# Patient Record
Sex: FEMALE | Race: WHITE | NOT HISPANIC OR LATINO
[De-identification: names, ages, dates, MRNs, and addresses within clinical notes are randomized per-mention and may not be internally consistent; named-entity substitution may affect disease eponyms.]

---

## 2017-01-27 PROBLEM — Z00.00 ENCOUNTER FOR PREVENTIVE HEALTH EXAMINATION: Status: ACTIVE | Noted: 2017-01-27

## 2017-01-30 PROBLEM — Z86.39 HISTORY OF HYPERCHOLESTEROLEMIA: Status: RESOLVED | Noted: 2017-01-30 | Resolved: 2017-01-30

## 2017-01-30 PROBLEM — Z86.39 HISTORY OF HYPOTHYROIDISM: Status: RESOLVED | Noted: 2017-01-30 | Resolved: 2017-01-30

## 2017-01-30 RX ORDER — OXYBUTYNIN CHLORIDE 2.5 MG/1
TABLET ORAL
Refills: 0 | Status: ACTIVE | COMMUNITY

## 2017-01-30 RX ORDER — RIVAROXABAN 20 MG/1
20 TABLET, FILM COATED ORAL
Qty: 30 | Refills: 3 | Status: ACTIVE | COMMUNITY

## 2017-01-30 RX ORDER — SIMVASTATIN 80 MG/1
TABLET, FILM COATED ORAL
Refills: 0 | Status: ACTIVE | COMMUNITY

## 2017-01-30 RX ORDER — LEVOTHYROXINE SODIUM 137 UG/1
TABLET ORAL
Refills: 0 | Status: ACTIVE | COMMUNITY

## 2017-02-01 ENCOUNTER — APPOINTMENT (OUTPATIENT)
Dept: ELECTROPHYSIOLOGY | Facility: CLINIC | Age: 77
End: 2017-02-01

## 2017-02-01 VITALS
DIASTOLIC BLOOD PRESSURE: 64 MMHG | HEART RATE: 69 BPM | SYSTOLIC BLOOD PRESSURE: 130 MMHG | HEIGHT: 68 IN | WEIGHT: 184 LBS | BODY MASS INDEX: 27.89 KG/M2

## 2017-02-01 DIAGNOSIS — Z86.39 PERSONAL HISTORY OF OTHER ENDOCRINE, NUTRITIONAL AND METABOLIC DISEASE: ICD-10-CM

## 2017-02-14 ENCOUNTER — TRANSCRIPTION ENCOUNTER (OUTPATIENT)
Age: 77
End: 2017-02-14

## 2017-02-14 ENCOUNTER — OUTPATIENT (OUTPATIENT)
Dept: OUTPATIENT SERVICES | Facility: HOSPITAL | Age: 77
LOS: 1 days | Discharge: HOME | End: 2017-02-14
Payer: COMMERCIAL

## 2017-02-14 VITALS
DIASTOLIC BLOOD PRESSURE: 79 MMHG | SYSTOLIC BLOOD PRESSURE: 177 MMHG | WEIGHT: 184.97 LBS | TEMPERATURE: 98 F | RESPIRATION RATE: 14 BRPM | HEART RATE: 66 BPM | HEIGHT: 68 IN | OXYGEN SATURATION: 96 %

## 2017-02-14 DIAGNOSIS — R00.1 BRADYCARDIA, UNSPECIFIED: ICD-10-CM

## 2017-02-14 DIAGNOSIS — Z90.49 ACQUIRED ABSENCE OF OTHER SPECIFIED PARTS OF DIGESTIVE TRACT: Chronic | ICD-10-CM

## 2017-02-14 PROCEDURE — 33282: CPT

## 2017-02-14 PROCEDURE — C1764: CPT

## 2017-02-14 RX ORDER — CEPHALEXIN 500 MG
500 CAPSULE ORAL ONCE
Qty: 0 | Refills: 0 | Status: COMPLETED | OUTPATIENT
Start: 2017-02-14 | End: 2017-02-14

## 2017-02-14 RX ADMIN — Medication 500 MILLIGRAM(S): at 07:41

## 2017-02-14 NOTE — DISCHARGE NOTE ADULT - CARE PROVIDER_API CALL
Quentin Sánchez  [address] [place] [address], [place] [address]  Phone: [phone]  Fax: (   )    -

## 2017-02-14 NOTE — DISCHARGE NOTE ADULT - CARE PLAN
Principal Discharge DX:	Status post placement of implantable loop recorder  Goal:	Successful post operative loop recorder care.  Instructions for follow-up, activity and diet:	Loop Recorder Incision Care:     - Remove the plastic and gauze dressing after 24 hours.   - Do not touch the incision until it is completely healed.   - There is Dermabond (skin glue) on your incision, which will start to flake off on its own over the next 2-3 weeks. Do not pick at or peal off the Dermabond.   - Do not apply soaps, creams, lotions, ointments or powders to the incision until it is completely healed.  - You should call the doctor if you notice redness, drainage, swelling, increased tenderness, hot sensation around the  incision, bleeding or incision edges pulling apart.  Instructions for follow-up, activity and diet:	Please follow up with Dr. Sánchez in 1-2 weeks at [facility]. Call to make an appointment. Principal Discharge DX:	Status post placement of implantable loop recorder  Goal:	Successful post operative loop recorder care.  Instructions for follow-up, activity and diet:	Loop Recorder Incision Care:     - Remove the plastic and gauze dressing after 24 hours.   - Do not touch the incision until it is completely healed.   - There is Dermabond (skin glue) on your incision, which will start to flake off on its own over the next 2-3 weeks. Do not pick at or peal off the Dermabond.   - Do not apply soaps, creams, lotions, ointments or powders to the incision until it is completely healed.  - You should call the doctor if you notice redness, drainage, swelling, increased tenderness, hot sensation around the  incision, bleeding or incision edges pulling apart.  Instructions for follow-up, activity and diet:	Please follow up with Dr. Sánchez in 1-2 weeks at [facility]. Call to make an appointment.

## 2017-02-14 NOTE — DISCHARGE NOTE ADULT - MEDICATION SUMMARY - MEDICATIONS TO TAKE
I will START or STAY ON the medications listed below when I get home from the hospital:    Xarelto 20 mg oral tablet  -- 1 tab(s) by mouth once a day (in the evening)  -- Indication: For Atrial fibrillation    simvastatin 40 mg oral tablet  -- 1 tab(s) by mouth once a day (at bedtime)  -- Indication: For Hypercholesterolemia    Synthroid 100 mcg (0.1 mg) oral tablet  -- 1 tab(s) by mouth once a day  -- Indication: For Hypothyroidism    oxybutynin 15 mg/24 hr oral tablet, extended release  -- 1 tab(s) by mouth once a day  -- Indication: For Prophylaxsis    Vitamin D3 2000 intl units oral capsule  -- 1 cap(s) by mouth once a day  -- Indication: For Prophylaxsis

## 2017-02-14 NOTE — DISCHARGE NOTE ADULT - PLAN OF CARE
Loop Recorder Incision Care:     - Remove the plastic and gauze dressing after 24 hours.   - Do not touch the incision until it is completely healed.   - There is Dermabond (skin glue) on your incision, which will start to flake off on its own over the next 2-3 weeks. Do not pick at or peal off the Dermabond.   - Do not apply soaps, creams, lotions, ointments or powders to the incision until it is completely healed.  - You should call the doctor if you notice redness, drainage, swelling, increased tenderness, hot sensation around the  incision, bleeding or incision edges pulling apart. Successful post operative loop recorder care. Please follow up with Dr. Sánchez in 1-2 weeks at [facility]. Call to make an appointment.

## 2017-02-14 NOTE — H&P CARDIOLOGY - PMH
Aortic valve insufficiency    Hypercholesterolemia    Hypothyroidism    Persistent atrial fibrillation

## 2017-02-14 NOTE — DISCHARGE NOTE ADULT - PATIENT PORTAL LINK FT
“You can access the FollowHealth Patient Portal, offered by [facility], by registering with the following website: [email or web address]”

## 2017-02-14 NOTE — H&P CARDIOLOGY - HISTORY OF PRESENT ILLNESS
76 year old female patient with a history of hyperlipidemia, hypothyroidism, and atrial fibrillation diagnosed in 4/22/16 on Xarelto who present with occasional episodes of sudden lightheadedness that occur while sitting. These episodes only last a few seconds and then resolve spontaneously. Outpatient monitoring reveals episodes of bradycardia down to the 50s. She denies syncope of overt palpitations. She presents today for elective loop recorder implant.     Echo: 5/5/16: EF 58%, LA 3.7cm, mild AI    Cardiologist: Dr. Paulino  PCP: Debi Neumann DO

## 2017-02-14 NOTE — DISCHARGE NOTE ADULT - HOSPITAL COURSE
76 year old female patient with a history of hyperlipidemia, hypothyroidism, and atrial fibrillation diagnosed in 4/22/16 on Xarelto who present with occasional episodes of sudden lightheadedness that occur while sitting. These episodes only last a few seconds and then resolve spontaneously. Outpatient monitoring reveals episodes of bradycardia down to the 50s. She denies syncope of overt palpitations. She is now s/p loop recorder implant.   Echo: 5/5/16: EF 58%, LA 3.7cm, mild AI  Cardiologist: Dr. Paulino  PCP: Debi Neumann DO

## 2017-02-14 NOTE — DISCHARGE NOTE ADULT - PROVIDER TOKENS
FREE:[LAST:[Gonzalo],FIRST:[Quentin],PHONE:[phone]],FAX:[(   )    -],ADDRESS:[[address] [place] [id number]]]

## 2017-02-17 DIAGNOSIS — I48.91 UNSPECIFIED ATRIAL FIBRILLATION: ICD-10-CM

## 2017-05-01 DIAGNOSIS — R00.1 BRADYCARDIA, UNSPECIFIED: ICD-10-CM

## 2017-05-01 DIAGNOSIS — Z78.9 OTHER SPECIFIED HEALTH STATUS: ICD-10-CM

## 2017-05-01 DIAGNOSIS — Z86.79 PERSONAL HISTORY OF OTHER DISEASES OF THE CIRCULATORY SYSTEM: ICD-10-CM

## 2017-05-01 DIAGNOSIS — Z79.01 LONG TERM (CURRENT) USE OF ANTICOAGULANTS: ICD-10-CM

## 2017-05-01 DIAGNOSIS — I07.1 RHEUMATIC TRICUSPID INSUFFICIENCY: ICD-10-CM

## 2017-05-03 ENCOUNTER — APPOINTMENT (OUTPATIENT)
Dept: ELECTROPHYSIOLOGY | Facility: CLINIC | Age: 77
End: 2017-05-03

## 2017-05-03 VITALS
SYSTOLIC BLOOD PRESSURE: 120 MMHG | HEIGHT: 68 IN | HEART RATE: 84 BPM | DIASTOLIC BLOOD PRESSURE: 80 MMHG | WEIGHT: 190 LBS | BODY MASS INDEX: 28.79 KG/M2

## 2017-06-07 ENCOUNTER — APPOINTMENT (OUTPATIENT)
Dept: ELECTROPHYSIOLOGY | Facility: CLINIC | Age: 77
End: 2017-06-07

## 2017-06-07 VITALS
SYSTOLIC BLOOD PRESSURE: 120 MMHG | DIASTOLIC BLOOD PRESSURE: 70 MMHG | HEIGHT: 68 IN | BODY MASS INDEX: 30.46 KG/M2 | WEIGHT: 201 LBS

## 2017-09-11 DIAGNOSIS — Z09 ENCOUNTER FOR FOLLOW-UP EXAMINATION AFTER COMPLETED TREATMENT FOR CONDITIONS OTHER THAN MALIGNANT NEOPLASM: ICD-10-CM

## 2017-09-13 ENCOUNTER — APPOINTMENT (OUTPATIENT)
Dept: ELECTROPHYSIOLOGY | Facility: CLINIC | Age: 77
End: 2017-09-13
Payer: MEDICARE

## 2017-09-13 VITALS
DIASTOLIC BLOOD PRESSURE: 80 MMHG | HEART RATE: 47 BPM | BODY MASS INDEX: 30.56 KG/M2 | SYSTOLIC BLOOD PRESSURE: 118 MMHG | WEIGHT: 201 LBS

## 2017-09-13 DIAGNOSIS — I49.5 SICK SINUS SYNDROME: ICD-10-CM

## 2017-09-13 PROCEDURE — 93000 ELECTROCARDIOGRAM COMPLETE: CPT

## 2017-09-13 PROCEDURE — 93290 INTERROG DEV EVAL ICPMS IP: CPT

## 2017-09-13 PROCEDURE — 99215 OFFICE O/P EST HI 40 MIN: CPT

## 2017-10-24 ENCOUNTER — OUTPATIENT (OUTPATIENT)
Dept: OUTPATIENT SERVICES | Facility: HOSPITAL | Age: 77
LOS: 1 days | End: 2017-10-24
Payer: COMMERCIAL

## 2017-10-24 VITALS
OXYGEN SATURATION: 95 % | HEIGHT: 68 IN | TEMPERATURE: 98 F | SYSTOLIC BLOOD PRESSURE: 169 MMHG | WEIGHT: 199.96 LBS | RESPIRATION RATE: 18 BRPM | DIASTOLIC BLOOD PRESSURE: 73 MMHG | HEART RATE: 58 BPM

## 2017-10-24 VITALS — HEIGHT: 68 IN | WEIGHT: 199.96 LBS

## 2017-10-24 DIAGNOSIS — Z87.898 PERSONAL HISTORY OF OTHER SPECIFIED CONDITIONS: Chronic | ICD-10-CM

## 2017-10-24 DIAGNOSIS — Z01.810 ENCOUNTER FOR PREPROCEDURAL CARDIOVASCULAR EXAMINATION: ICD-10-CM

## 2017-10-24 DIAGNOSIS — Z90.49 ACQUIRED ABSENCE OF OTHER SPECIFIED PARTS OF DIGESTIVE TRACT: Chronic | ICD-10-CM

## 2017-10-24 LAB
ANION GAP SERPL CALC-SCNC: 14 MMOL/L — SIGNIFICANT CHANGE UP (ref 5–17)
APTT BLD: 33.8 SEC — SIGNIFICANT CHANGE UP (ref 27.5–37.4)
BLD GP AB SCN SERPL QL: SIGNIFICANT CHANGE UP
BUN SERPL-MCNC: 15 MG/DL — SIGNIFICANT CHANGE UP (ref 8–20)
CALCIUM SERPL-MCNC: 9.6 MG/DL — SIGNIFICANT CHANGE UP (ref 8.6–10.2)
CHLORIDE SERPL-SCNC: 101 MMOL/L — SIGNIFICANT CHANGE UP (ref 98–107)
CO2 SERPL-SCNC: 26 MMOL/L — SIGNIFICANT CHANGE UP (ref 22–29)
CREAT SERPL-MCNC: 0.63 MG/DL — SIGNIFICANT CHANGE UP (ref 0.5–1.3)
GLUCOSE SERPL-MCNC: 100 MG/DL — SIGNIFICANT CHANGE UP (ref 70–115)
HCT VFR BLD CALC: 38.1 % — SIGNIFICANT CHANGE UP (ref 37–47)
HGB BLD-MCNC: 12.7 G/DL — SIGNIFICANT CHANGE UP (ref 12–16)
INR BLD: 1.08 RATIO — SIGNIFICANT CHANGE UP (ref 0.88–1.16)
MCHC RBC-ENTMCNC: 30.5 PG — SIGNIFICANT CHANGE UP (ref 27–31)
MCHC RBC-ENTMCNC: 33.3 G/DL — SIGNIFICANT CHANGE UP (ref 32–36)
MCV RBC AUTO: 91.4 FL — SIGNIFICANT CHANGE UP (ref 81–99)
PLATELET # BLD AUTO: 210 K/UL — SIGNIFICANT CHANGE UP (ref 150–400)
POTASSIUM SERPL-MCNC: 4.5 MMOL/L — SIGNIFICANT CHANGE UP (ref 3.5–5.3)
POTASSIUM SERPL-SCNC: 4.5 MMOL/L — SIGNIFICANT CHANGE UP (ref 3.5–5.3)
PROTHROM AB SERPL-ACNC: 11.9 SEC — SIGNIFICANT CHANGE UP (ref 9.8–12.7)
RBC # BLD: 4.17 M/UL — LOW (ref 4.4–5.2)
RBC # FLD: 13.4 % — SIGNIFICANT CHANGE UP (ref 11–15.6)
SODIUM SERPL-SCNC: 141 MMOL/L — SIGNIFICANT CHANGE UP (ref 135–145)
TSH SERPL-MCNC: 1.25 UIU/ML — SIGNIFICANT CHANGE UP (ref 0.27–4.2)
TYPE + AB SCN PNL BLD: SIGNIFICANT CHANGE UP
WBC # BLD: 5.6 K/UL — SIGNIFICANT CHANGE UP (ref 4.8–10.8)
WBC # FLD AUTO: 5.6 K/UL — SIGNIFICANT CHANGE UP (ref 4.8–10.8)

## 2017-10-24 PROCEDURE — 93010 ELECTROCARDIOGRAM REPORT: CPT

## 2017-10-24 PROCEDURE — 93005 ELECTROCARDIOGRAM TRACING: CPT

## 2017-10-24 PROCEDURE — G0463: CPT

## 2017-10-24 PROCEDURE — 36415 COLL VENOUS BLD VENIPUNCTURE: CPT

## 2017-10-24 PROCEDURE — 85730 THROMBOPLASTIN TIME PARTIAL: CPT

## 2017-10-24 PROCEDURE — 86850 RBC ANTIBODY SCREEN: CPT

## 2017-10-24 PROCEDURE — 86901 BLOOD TYPING SEROLOGIC RH(D): CPT

## 2017-10-24 PROCEDURE — 80048 BASIC METABOLIC PNL TOTAL CA: CPT

## 2017-10-24 PROCEDURE — 86900 BLOOD TYPING SEROLOGIC ABO: CPT

## 2017-10-24 PROCEDURE — 85610 PROTHROMBIN TIME: CPT

## 2017-10-24 PROCEDURE — 84443 ASSAY THYROID STIM HORMONE: CPT

## 2017-10-24 PROCEDURE — 85027 COMPLETE CBC AUTOMATED: CPT

## 2017-10-24 NOTE — ASU PATIENT PROFILE, ADULT - PMH
Aortic valve insufficiency    Fall  at  home,  no  warning,  no  overt cause,  possible syncope,  had  cat  scan  bruise on left hip  seen at  [facility]  sept 19 2017,  no  injuries  Hypercholesterolemia    Hypothyroidism    Persistent atrial fibrillation    Tachy-sunitha syndrome

## 2017-10-24 NOTE — H&P PST ADULT - PMH
Aortic valve insufficiency    Hypercholesterolemia    Hypothyroidism    Persistent atrial fibrillation Aortic valve insufficiency    Hypercholesterolemia    Hypothyroidism    Persistent atrial fibrillation    Tachy-sunitha syndrome

## 2017-10-24 NOTE — H&P PST ADULT - HISTORY OF PRESENT ILLNESS
77 year old female patient with a history of hypothyroidism, paroxysmal atrial fibrillation and marked bradycardia as found on her loop recorder. She was initially diagnosed with AFib in 4/2016 adn has been well rate controlled. She underwent an ILR implant 2/2017 and was found to have episodes of rapid AFib followed by episodes of bradycardia with rates in the 30-40s. She reports constant fatigue and admits to episodes of lightheadedness but no overt syncope.     Stress Test: 9/8/16: no ischemia seen.   Echo: 5/5/2016: EF 58%, LA 3.7cm, mild AI, mild MR, trace-mild TR

## 2017-10-27 ENCOUNTER — TRANSCRIPTION ENCOUNTER (OUTPATIENT)
Age: 77
End: 2017-10-27

## 2017-10-27 ENCOUNTER — INPATIENT (INPATIENT)
Facility: HOSPITAL | Age: 77
LOS: 0 days | Discharge: HOME | DRG: 244 | End: 2017-10-28
Attending: STUDENT IN AN ORGANIZED HEALTH CARE EDUCATION/TRAINING PROGRAM | Admitting: STUDENT IN AN ORGANIZED HEALTH CARE EDUCATION/TRAINING PROGRAM
Payer: COMMERCIAL

## 2017-10-27 VITALS
RESPIRATION RATE: 18 BRPM | OXYGEN SATURATION: 97 % | TEMPERATURE: 98 F | SYSTOLIC BLOOD PRESSURE: 164 MMHG | HEART RATE: 66 BPM | DIASTOLIC BLOOD PRESSURE: 77 MMHG

## 2017-10-27 DIAGNOSIS — Z87.898 PERSONAL HISTORY OF OTHER SPECIFIED CONDITIONS: Chronic | ICD-10-CM

## 2017-10-27 DIAGNOSIS — Z90.49 ACQUIRED ABSENCE OF OTHER SPECIFIED PARTS OF DIGESTIVE TRACT: Chronic | ICD-10-CM

## 2017-10-27 DIAGNOSIS — I49.5 SICK SINUS SYNDROME: ICD-10-CM

## 2017-10-27 PROCEDURE — 93010 ELECTROCARDIOGRAM REPORT: CPT

## 2017-10-27 PROCEDURE — 33208 INSRT HEART PM ATRIAL & VENT: CPT

## 2017-10-27 PROCEDURE — 33284: CPT

## 2017-10-27 RX ORDER — LEVOTHYROXINE SODIUM 125 MCG
100 TABLET ORAL DAILY
Qty: 0 | Refills: 0 | Status: DISCONTINUED | OUTPATIENT
Start: 2017-10-27 | End: 2017-10-28

## 2017-10-27 RX ORDER — ALPRAZOLAM 0.25 MG
0.25 TABLET ORAL EVERY 6 HOURS
Qty: 0 | Refills: 0 | Status: DISCONTINUED | OUTPATIENT
Start: 2017-10-27 | End: 2017-10-28

## 2017-10-27 RX ORDER — ACETAMINOPHEN 500 MG
650 TABLET ORAL EVERY 6 HOURS
Qty: 0 | Refills: 0 | Status: DISCONTINUED | OUTPATIENT
Start: 2017-10-27 | End: 2017-10-28

## 2017-10-27 RX ORDER — SIMVASTATIN 20 MG/1
40 TABLET, FILM COATED ORAL AT BEDTIME
Qty: 0 | Refills: 0 | Status: DISCONTINUED | OUTPATIENT
Start: 2017-10-27 | End: 2017-10-28

## 2017-10-27 RX ORDER — CHOLECALCIFEROL (VITAMIN D3) 125 MCG
2000 CAPSULE ORAL DAILY
Qty: 0 | Refills: 0 | Status: DISCONTINUED | OUTPATIENT
Start: 2017-10-27 | End: 2017-10-28

## 2017-10-27 RX ORDER — ONDANSETRON 8 MG/1
4 TABLET, FILM COATED ORAL EVERY 6 HOURS
Qty: 0 | Refills: 0 | Status: DISCONTINUED | OUTPATIENT
Start: 2017-10-27 | End: 2017-10-28

## 2017-10-27 RX ORDER — OXYCODONE HYDROCHLORIDE 5 MG/1
5 TABLET ORAL EVERY 4 HOURS
Qty: 0 | Refills: 0 | Status: DISCONTINUED | OUTPATIENT
Start: 2017-10-27 | End: 2017-10-28

## 2017-10-27 RX ORDER — CEFAZOLIN SODIUM 1 G
2000 VIAL (EA) INJECTION EVERY 8 HOURS
Qty: 0 | Refills: 0 | Status: COMPLETED | OUTPATIENT
Start: 2017-10-27 | End: 2017-10-27

## 2017-10-27 RX ADMIN — Medication 100 MILLIGRAM(S): at 16:27

## 2017-10-27 RX ADMIN — Medication 2000 UNIT(S): at 17:27

## 2017-10-27 RX ADMIN — Medication 100 MILLIGRAM(S): at 23:17

## 2017-10-27 RX ADMIN — SIMVASTATIN 40 MILLIGRAM(S): 20 TABLET, FILM COATED ORAL at 21:59

## 2017-10-27 NOTE — DISCHARGE NOTE ADULT - INSTRUCTIONS
Choose lean meats and poultry without skin and prepare them without added saturated and trans fat.  Eat fish at least twice a week. Recent research shows that eating oily fish containing omega-3 fatty acids (for example, salmon, trout and herring) may help lower your risk of death from coronary artery disease.  Select fat-free, 1 percent fat and low-fat dairy products.  Cut back on foods containing partially hydrogenated vegetable oils to reduce trans fat in your diet.   To lower cholesterol, reduce saturated fat to no more than 5 to 6 percent of total calories. For someone eating 2,000 calories a day, that’s about 13 grams of saturated fat.  Cut back on beverages and foods with added sugars.  Choose and prepare foods with little or no salt. To lower blood pressure, aim to eat no more than 2,400 milligrams of sodium per day. Reducing daily intake to 1,500 mg is desirable because it can lower blood pressure even further.  If you drink alcohol, drink in moderation. That means one drink per day if you’re a woman and two drinks  per day if you’re a man.  Follow the American Heart Association recommendations when you eat out, and keep an eye on your portion sizes. monitor pacemaker site for signs of infection

## 2017-10-27 NOTE — PROGRESS NOTE ADULT - SUBJECTIVE AND OBJECTIVE BOX
PROCEDURE(S): Medtronic Dual Chamber Pacemaker Implant (via cephalic cutdown) and ILR Explant    ELECTRPHYSIOLOGIST(S): Quentin Sánchez MD    COMPLICATIONS:  none          DISPOSITION:  Observation Unit           CONDITION: Stable      Pt doing well s/p Medtronic dual chamber pacemaker implant. Denies complaint    Exam:   T(C): 36.6 (10-27-17 @ 07:42), Max: 36.6 (10-27-17 @ 07:42)  HR: 66 (10-27-17 @ 07:42) (66 - 66)  BP: 164/77 (10-27-17 @ 07:42) (164/77 - 164/77)  RR: 18 (10-27-17 @ 07:42) (18 - 18)  SpO2: 97% (10-27-17 @ 07:42) (97% - 97%)  VSS, NAD, A&O x 3  Incisions: both ILR & pacemaker dressings C/D/I; no bleeding, hematoma, erythema or edema  Card: S1/S2, RRR, no m/g/r  Resp: lungs CTA b/l  Abd: S/NT/ND  Ext: no edema, distal pulses intact    Assessment:   77 year old female patient with a history of hypothyroidism, paroxysmal atrial fibrillation and symptomatic bradycardia secondary to marked sinus node dysfunction as documented on her loop recorder. Now s/p uncomplicated MDT DC PPM implant via cephalic cutdown and ILR explant.     Plan:   Bedrest x 4 hours post implant, then OOB w/ assist & progress as tolerated.    Continued observation on telemetry overnight.   Cont Ancef 2gm IV q hours x 2 additional doses to complete 24 hour course.   Pain control with PO analgesia PRN.   NO HEPARIN OR LOVENOX, INCLUDING PROPHYLACTIC/SUBCUT DOSING, UNTIL OTHERWISE ADVISED BY CHRISTIANA Hartley to resume 10/29/17 PM.   Resume other home medications now.   PA/Lat CXR and device check in AM.   Pending status overnight, anticipate d/c home tomorrow with outpt f/up in 1-2 weeks. PROCEDURE(S): Medtronic Dual Chamber Pacemaker Implant (via cephalic cutdown) and ILR Explant    ELECTRPHYSIOLOGIST(S): Quentin Sánchez MD    COMPLICATIONS:  none          DISPOSITION:  Observation Unit           CONDITION: Stable      Pt doing well s/p Medtronic dual chamber pacemaker implant. Denies complaint    Exam:   T(C): 36.6 (10-27-17 @ 07:42), Max: 36.6 (10-27-17 @ 07:42)  HR: 66 (10-27-17 @ 07:42) (66 - 66)  BP: 164/77 (10-27-17 @ 07:42) (164/77 - 164/77)  RR: 18 (10-27-17 @ 07:42) (18 - 18)  SpO2: 97% (10-27-17 @ 07:42) (97% - 97%)  VSS, NAD, A&O x 3  Incisions: both ILR & pacemaker dressings C/D/I; no bleeding, hematoma, erythema or edema  Card: S1/S2, RRR, no m/g/r  Resp: lungs CTA b/l  Abd: S/NT/ND  Ext: no edema, distal pulses intact    Assessment:   77 year old female patient with a history of hypothyroidism, paroxysmal atrial fibrillation and symptomatic bradycardia secondary to marked sinus node dysfunction as documented on her loop recorder. Now s/p uncomplicated MDT DC PPM implant via cephalic cutdown and ILR explant.     Plan:   Bedrest x 4 hours post implant, then OOB w/ assist & progress as tolerated.    Continued observation on telemetry overnight.   Cont Ancef 2gm IV q hours x 2 additional doses to complete 24 hour course.   Pain control with PO analgesia PRN.   NO HEPARIN OR LOVENOX, INCLUDING PROPHYLACTIC/SUBCUT DOSING, UNTIL OTHERWISE ADVISED BY EP.   Xarelto to resume 10/30/17.   Resume other home medications now.   PA/Lat CXR and device check in AM.   Pending status overnight, anticipate d/c home tomorrow with outpt f/up in 1-2 weeks.

## 2017-10-27 NOTE — DISCHARGE NOTE ADULT - PLAN OF CARE
optimize cardiac health Cardiac Device Implant Post Operative Instructions  - Bruising around the implant site or over the chest, side or arm near the incision is normal, and will take a few weeks to resolve.  -Do not lift the affected arm higher than 90 degrees (shoulder height) in any direction for 4 weeks.   - Do not push, pull or lift anything heavier than 10 lbs (about a gallon of milk) with the affected arm for 4 weeks.     - Do not touch the incision until it is completely healed.   - There are Steristrips (white strips of tape) on your incisions, which will start to peal off on their own over the next 2-3 weeks. Do not pick at or peal off the Steristrips.   - Do not apply soaps, creams, lotions, ointments or powders to the incision until it is completely healed.  You should call the doctor if:   - you notice redness, drainage, swelling, increased tenderness, hot sensation around the  incision, bleeding or incision edges pulling apart.  - your temperature is greater than 100 degress F for more than 24 hours.  - you notice swelling or bulging at the incision or around the device that was not there when you left the hospital or is increasing in size.  -you experience increased difficulty breathing.  - you notice new/worsening swelling in your legs and ankles.  - you faint or have dizzy spells.  -you have any questions or concerns regarding your device or the procedure.

## 2017-10-27 NOTE — DISCHARGE NOTE ADULT - PROVIDER TOKENS
FREE:[LAST:[Gonzalo],FIRST:[Quentin],PHONE:[phone]],FAX:[(   )    -],ADDRESS:[[address] [place] [address]]]

## 2017-10-27 NOTE — DISCHARGE NOTE ADULT - MEDICATION SUMMARY - MEDICATIONS TO TAKE
I will START or STAY ON the medications listed below when I get home from the hospital:    Xarelto 20 mg oral tablet  -- 1 tab(s) by mouth once a day (in the evening)  -- Indication: For blood thinner     simvastatin 40 mg oral tablet  -- 1 tab(s) by mouth once a day (at bedtime)  -- Indication: For cho    Synthroid 100 mcg (0.1 mg) oral tablet  -- 1 tab(s) by mouth once a day  -- Indication: For Thyroid    oxybutynin 15 mg/24 hr oral tablet, extended release  -- 1 tab(s) by mouth once a day  -- Indication: For bladder    Vitamin D3 2000 intl units oral capsule  -- 1 cap(s) by mouth once a day  -- Indication: For vit

## 2017-10-27 NOTE — DISCHARGE NOTE ADULT - CARE PLAN
Principal Discharge DX:	Tachy-sunitha syndrome  Goal:	optimize cardiac health  Instructions for follow-up, activity and diet:	Cardiac Device Implant Post Operative Instructions  - Bruising around the implant site or over the chest, side or arm near the incision is normal, and will take a few weeks to resolve.  -Do not lift the affected arm higher than 90 degrees (shoulder height) in any direction for 4 weeks.   - Do not push, pull or lift anything heavier than 10 lbs (about a gallon of milk) with the affected arm for 4 weeks.     - Do not touch the incision until it is completely healed.   - There are Steristrips (white strips of tape) on your incisions, which will start to peal off on their own over the next 2-3 weeks. Do not pick at or peal off the Steristrips.   - Do not apply soaps, creams, lotions, ointments or powders to the incision until it is completely healed.  You should call the doctor if:   - you notice redness, drainage, swelling, increased tenderness, hot sensation around the  incision, bleeding or incision edges pulling apart.  - your temperature is greater than 100 degress F for more than 24 hours.  - you notice swelling or bulging at the incision or around the device that was not there when you left the hospital or is increasing in size.  -you experience increased difficulty breathing.  - you notice new/worsening swelling in your legs and ankles.  - you faint or have dizzy spells.  -you have any questions or concerns regarding your device or the procedure.

## 2017-10-27 NOTE — DISCHARGE NOTE ADULT - HOSPITAL COURSE
77 year old female patient with a history of hypothyroidism, paroxysmal atrial fibrillation and symptomatic bradycardia secondary to marked sinus node dysfunction as documented on her loop recorder. She presented electively 10/27/17 and underwent uncomplicated Medtronic dual chamber pacemaker implant and ILR explant.

## 2017-10-27 NOTE — DISCHARGE NOTE ADULT - ADDITIONAL INSTRUCTIONS
Follow up with Dr. Sánchez at [facility] in 2 weeks. Please call [phone] to schedule an appointment.

## 2017-10-27 NOTE — DISCHARGE NOTE ADULT - PATIENT PORTAL LINK FT
“You can access the FollowHealth Patient Portal, offered by [facility], by registering with the following website: [email or web address]”

## 2017-10-28 VITALS
SYSTOLIC BLOOD PRESSURE: 161 MMHG | HEART RATE: 60 BPM | OXYGEN SATURATION: 97 % | RESPIRATION RATE: 18 BRPM | DIASTOLIC BLOOD PRESSURE: 74 MMHG

## 2017-10-28 LAB
ANION GAP SERPL CALC-SCNC: 14 MMOL/L — SIGNIFICANT CHANGE UP (ref 5–17)
BUN SERPL-MCNC: 7 MG/DL — LOW (ref 8–20)
CALCIUM SERPL-MCNC: 9.2 MG/DL — SIGNIFICANT CHANGE UP (ref 8.6–10.2)
CHLORIDE SERPL-SCNC: 100 MMOL/L — SIGNIFICANT CHANGE UP (ref 98–107)
CO2 SERPL-SCNC: 23 MMOL/L — SIGNIFICANT CHANGE UP (ref 22–29)
CREAT SERPL-MCNC: 0.53 MG/DL — SIGNIFICANT CHANGE UP (ref 0.5–1.3)
GLUCOSE SERPL-MCNC: 99 MG/DL — SIGNIFICANT CHANGE UP (ref 70–115)
HCT VFR BLD CALC: 39.3 % — SIGNIFICANT CHANGE UP (ref 37–47)
HGB BLD-MCNC: 13.2 G/DL — SIGNIFICANT CHANGE UP (ref 12–16)
MAGNESIUM SERPL-MCNC: 1.9 MG/DL — SIGNIFICANT CHANGE UP (ref 1.6–2.6)
MCHC RBC-ENTMCNC: 30.7 PG — SIGNIFICANT CHANGE UP (ref 27–31)
MCHC RBC-ENTMCNC: 33.6 G/DL — SIGNIFICANT CHANGE UP (ref 32–36)
MCV RBC AUTO: 91.4 FL — SIGNIFICANT CHANGE UP (ref 81–99)
PLATELET # BLD AUTO: 183 K/UL — SIGNIFICANT CHANGE UP (ref 150–400)
POTASSIUM SERPL-MCNC: 4.3 MMOL/L — SIGNIFICANT CHANGE UP (ref 3.5–5.3)
POTASSIUM SERPL-SCNC: 4.3 MMOL/L — SIGNIFICANT CHANGE UP (ref 3.5–5.3)
RBC # BLD: 4.3 M/UL — LOW (ref 4.4–5.2)
RBC # FLD: 13.4 % — SIGNIFICANT CHANGE UP (ref 11–15.6)
SODIUM SERPL-SCNC: 137 MMOL/L — SIGNIFICANT CHANGE UP (ref 135–145)
WBC # BLD: 6.5 K/UL — SIGNIFICANT CHANGE UP (ref 4.8–10.8)
WBC # FLD AUTO: 6.5 K/UL — SIGNIFICANT CHANGE UP (ref 4.8–10.8)

## 2017-10-28 PROCEDURE — 71020: CPT | Mod: 26

## 2017-10-28 PROCEDURE — 93010 ELECTROCARDIOGRAM REPORT: CPT

## 2017-10-28 RX ADMIN — Medication 100 MICROGRAM(S): at 06:05

## 2017-10-28 NOTE — PROGRESS NOTE ADULT - SUBJECTIVE AND OBJECTIVE BOX
Patient is a 77y old  Female who presents with a chief complaint of Elective Medtronic dual chamber pacemaker implant and ILR explant (27 Oct 2017 09:45)      HPI:    77 year old female patient with a history of hypothyroidism, paroxysmal atrial fibrillation and symptomatic bradycardia secondary to marked sinus node dysfunction as documented on her loop recorder. Now s/p uncomplicated MDT DC PPM implant via cephalic cutdown and ILR explant.    PAST MEDICAL & SURGICAL HISTORY:  Fall: at  home,  no  warning,  no  overt cause,  possible syncope,  had  cat  scan  bruise on left hip  seen at  [facility]  sept  19 2017,  no  injuries  Tachy-sunitha syndrome  Persistent atrial fibrillation  Aortic valve insufficiency  Hypercholesterolemia  Hypothyroidism  H/O urinary incontinence  History of appendectomy      PA and LAT CXR no pneumo leads identified    EKG a paced         MEDICATIONS  (STANDING):  cholecalciferol 2000 Unit(s) Oral daily  levothyroxine 100 MICROGram(s) Oral daily  simvastatin 40 milliGRAM(s) Oral at bedtime    MEDICATIONS  (PRN):  acetaminophen   Tablet. 650 milliGRAM(s) Oral every 6 hours PRN Mild Pain (1 - 3)  ALPRAZolam 0.25 milliGRAM(s) Oral every 6 hours PRN anxiety and/or insomnia  aluminum hydroxide/magnesium hydroxide/simethicone Suspension 30 milliLiter(s) Oral every 4 hours PRN Dyspepsia  ondansetron Injectable 4 milliGRAM(s) IV Push every 6 hours PRN Nausea and/or Vomiting  oxyCODONE    IR 5 milliGRAM(s) Oral every 4 hours PRN Moderate Pain (4 - 6)    	    Vital Signs Last 24 Hrs  T(C): 36.4 (27 Oct 2017 22:00), Max: 36.6 (27 Oct 2017 18:31)  T(F): 97.6 (27 Oct 2017 22:00), Max: 97.9 (27 Oct 2017 18:31)  HR: 59 (28 Oct 2017 07:45) (59 - 60)  BP: 151/81 (28 Oct 2017 07:45) (138/66 - 164/80)  BP(mean): --  RR: 18 (28 Oct 2017 07:45) (15 - 20)  SpO2: 98% (28 Oct 2017 07:45) (96% - 98%)        PHYSICAL EXAM:  Appearance: Normal, well nourished	  HEENT:   Normal oral mucosa, PERRL, EOMI, sclera non-icteric	  Lymphatic: No cervical lymphadenopathy  Cardiovascular: Normal S1 S2, No JVD, No cardiac murmurs, No carotid bruits, No peripheral edema  Respiratory: Lungs clear to auscultation	  Psychiatry: A & O x 3, Mood & affect appropriate  Gastrointestinal:  Soft, Non-tender, + BS, no bruits	  Skin: No rashes, No ecchymoses, No cyanosis  Neurologic: Grossly non-focal with full strength in all four extremities  Extremities: Normal range of motion, No clubbing, cyanosis or edema  Vascular: Peripheral pulses palpable 2+ bilaterally  left chest wall ecchymotic no hematoma no bleeding     I  LABS:                        13.2   6.5   )-----------( 183      ( 28 Oct 2017 07:00 )             39.3     10-28    137  |  100  |  7.0<L>  ----------------------------<  99  4.3   |  23.0  |  0.53    Ca    9.2      28 Oct 2017 07:00  Mg     1.9     10-28

## 2017-10-28 NOTE — PROGRESS NOTE ADULT - ASSESSMENT
77 year old female patient with a history of hypothyroidism, paroxysmal atrial fibrillation and symptomatic bradycardia secondary to marked sinus node dysfunction as documented on her loop recorder. Now s/p uncomplicated MDT DC PPM implant via cephalic cutdown and ILR explant.    Plan   dc home   resume Xarelto on 10/30  post PPM instructions reviewed with PT  continue all home meds   follow up with Dr Sánchez 2 weeks

## 2017-11-22 ENCOUNTER — APPOINTMENT (OUTPATIENT)
Dept: ELECTROPHYSIOLOGY | Facility: CLINIC | Age: 77
End: 2017-11-22
Payer: MEDICARE

## 2017-11-22 VITALS
SYSTOLIC BLOOD PRESSURE: 160 MMHG | DIASTOLIC BLOOD PRESSURE: 80 MMHG | HEIGHT: 68 IN | WEIGHT: 196 LBS | HEART RATE: 69 BPM | BODY MASS INDEX: 29.7 KG/M2

## 2017-11-22 PROCEDURE — 93280 PM DEVICE PROGR EVAL DUAL: CPT

## 2017-11-22 PROCEDURE — 93000 ELECTROCARDIOGRAM COMPLETE: CPT | Mod: 59

## 2017-11-22 PROCEDURE — 99024 POSTOP FOLLOW-UP VISIT: CPT

## 2017-12-19 PROCEDURE — 85027 COMPLETE CBC AUTOMATED: CPT

## 2017-12-19 PROCEDURE — C1898: CPT

## 2017-12-19 PROCEDURE — 80048 BASIC METABOLIC PNL TOTAL CA: CPT

## 2017-12-19 PROCEDURE — 71046 X-RAY EXAM CHEST 2 VIEWS: CPT

## 2017-12-19 PROCEDURE — C1769: CPT

## 2017-12-19 PROCEDURE — 83735 ASSAY OF MAGNESIUM: CPT

## 2017-12-19 PROCEDURE — 93005 ELECTROCARDIOGRAM TRACING: CPT

## 2017-12-19 PROCEDURE — C1785: CPT

## 2017-12-19 PROCEDURE — 36415 COLL VENOUS BLD VENIPUNCTURE: CPT

## 2018-04-20 ENCOUNTER — APPOINTMENT (OUTPATIENT)
Dept: ORTHOPEDIC SURGERY | Facility: CLINIC | Age: 78
End: 2018-04-20

## 2018-06-08 NOTE — ASU PATIENT PROFILE, ADULT - IS PATIENT PREGNANT?
Preoperative diagnosis: IUP at 40w6d,  Desires repeat  section.   Postoperative diagnosis: Same    Procedure: Repeat  low transverse  section via Pfannenstiel incision.      Surgeon: Lloyd You M.D.     Asst.:  Megan Love DO assisted for 100 percent of the case    Anesthesia: Spinal     Complications: None    Estimated blood loss: 500 mL  Urine output: See Anesthesia note    Indications:     Findings: Viable  Male      Specimens: none    Procedure:Risks, benefits and alternatives were explained to the patient who consented. She was then taken to the operating room where spinal  anesthesia was found to be adequate. She was then prepped and draped in normal sterile fashion in the dorsal supine position with a leftward tilt. A Pfannenstiel skin incision was made with the scalpel and carried through to the underlying layer of fascia. The fascia was incised just to the left and just to the right of the midline and and extended laterally with Brice scissors. Kocher clamps elevated and the underlying rectus muscles dissected off bluntly. Brice scissors were used to dissect from muscles in the midline. Attention was then turned to the inferior aspect of this incision which, in a similar fashion, was tented up with Kocher clamps and rectus muscles dissected off bluntly. Again brice scissors were used to dissect the muscles from the fascia in the midline. The rectus muscles were  in the midline, the peritoneum identified and entered bluntly.    A   ring retractor was inserted and vesicouterine peritoneum identified, grasp with the pickups and entered with Metzenbaum scissors, the incision was extended laterally and bladder flap created digitally.  The lower uterine segment was incised in a transverse fashion with the scalpel. Incision was then extended laterally. The infant's head   followed by the rest of the body was delivered atraumatically . The umbilical cord was milked towards the  infant prior to being clamped and cut. The infant was handed off to waiting nurses.   Cord gases were sent. The placenta was removed with traction. The uterus was cleared of all clots and debris. Uterine incision was repaired with 1-0 chromic  in a running locked fashion. A second layer of the same suture was used to obtain excellent hemostasis.  Tubes and ovaries were inspected and found to be normal. The gutters were cleared of all clots. Irrigation was performed.  The fascia was reapproximated with 0 vicryl in a running fashion. Subcutaneous layer was closed using 3-0 vicryl  in a running fashion. Skin was closed using subcuticular 3-0 vicryl   The patient tolerated the procedure well. Sponge lap and needle counts were correct x2. 2 g of Ancef were given prior to start of incision. The patient was taken to recovery room in stable condition.          no

## 2020-06-08 NOTE — ASU PATIENT PROFILE, ADULT - NS PRO PT RIGHT SUPPORT PERSON
6/8/2020         RE: Adarsh Salvador  [address] [facility] [id number]        Dear Colleague,    Thank you for referring your patient, Adarsh Salvador, to the [facility]. Please see a copy of my visit note below.    [facility]      Nerve Conduction & EMG Report          Patient:       Adarsh Salvador  Patient ID:    [id number]  Gender:        Male  YOB: 1969  Age:           51 Years 1 Months      History and Examination:  Adarsh Salvador is a 51 year old man with a previously characterized diabetic neuropathy. The patient reports sensory deficits and tripping over objects with his right foot. Examination demonstrates symmetric sensory loss in the distal lower limbs. He is referred for a followup study for comparison with previous evaluations performed in 2017.     Techniques:  Motor conduction studies were done with surface recording electrodes. Sensory conduction studies were performed with surface electrodes, unless indicated otherwise by (n), designating the use of subdermal recording electrodes. Temperature was monitored and recorded throughout the study. Upper extremities were maintained at a temperature of 32 degrees Centigrade or higher.  Lower extremities were maintained at a temperature of 31 degrees Centigrade or higher. EMG was done with a concentric needle electrode and was limited at the patient s request.     Results:  Sural sensory nerve action potentials were mildly attenuated bilaterally. The left superficial peroneal sensory nerve action potential was mildly attenuated. A right ulnar sensory conduction study demonstrated mild slowing of conduction. Right median and radial sensory conduction studies were normal. A right peroneal motor conduction study demonstrated mild slowing of conduction. A left peroneal motor conduction study demonstrated moderate attenuation of amplitude. There was no evidence of conduction slowing across the fibular head in  either peroneal distribution. A right tibial motor conduction study demonstrated moderate attenuation of amplitude and absence of the response after stimulation in the popliteal fossa, which may reflect submaximal stimulation. A left tibial motor conduction study demonstrated marked attenuation of amplitude and mild slowing of conduction. A right median motor conduction study demonstrated minimal prolongation of distal latency. A right ulnar motor conduction study demonstrated mild prolongation of distal latency and moderate slowing of conduction across the elbow. Peroneal F-response studies demonstrated A-waves with a single F-response on the left. Electromyography of peroneal-innervated musculature in the right lower limb was normal.     Interpretation:  This is an abnormal study, demonstrating electrophysiologic evidence of the followin. 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 Comparison with two studies performed in 2017 demonstrates minimal interval worsening of amplitude attenuation.   2. 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       Vinnie Wall MD        Sensory NCS      Nerve / Sites Rec. Site Onset Peak Ref. NP Amp Ref. PP Amp Dist Jacob Ref. Temp     ms ms ms  V  V  V cm m/s m/s  C   R MEDIAN - Dig II Anti      Wrist Dig II 2.60 3.49  11.9 10.0 10.4 14 53.8 48.0 32.5   R ULNAR - Dig V Anti      Wrist Dig V 2.97 3.80  9.3 8.0 8.5 12.5 42.1 48.0 32.1   R RADIAL - Snuff      Forearm Snuff 1.93 2.50  16.4 15.0 21.2 12.5 64.9 48.0 32.5   L SURAL - Lat Mall      Calf Ankle 3.13 3.96  5.1 8.0 3.5 14 44.8 38.0 30.3      Calf Ankle 2.97 3.75  5.3 8.0 1.7 14 47.2     R SURAL - Lat Mall      Calf Ankle 3.59 4.38  5.4 8.0 5.1 14 39.0 38.0 31   L SUP PERONEAL      Lat Leg Martínez 3.23 4.43  3.0  0.98 12.5 38.7 38.0 30.6   R MEDIAN - Ulnar - Palmar      Median Wrist 1.61 2.08 2.40 45.8  59.3 8 49.5  32.5      Ulnar Wrist 1.67 2.29 2.40 11.5  25.4 8 48.0  32.6       Motor NCS      Nerve /  Sites Rec. Site Lat Ref. Amp Ref. Rel Amp Dist Jacob Ref. Dur. Area Temp.     ms ms mV mV % cm m/s m/s ms %  C   R MEDIAN - APB      Wrist APB 4.53 4.40 6.3 5.0 100 8   5.21 100 32.5      Elbow APB 9.53  6.3  100 24 48.0 48.0 5.83 98.2 32.1   R ULNAR - ADM      Wrist ADM 3.65 3.50 11.3 5.0 100 8   5.05 100 32.7      B.Elbow ADM 8.13  10.7  94.5 23 51.3 48.0 5.42 97.2 32.9      A.Elbow ADM 10.42  10.2  90.5 9 39.3 48.0 5.68 95.7 32.6   R PERONEAL - EDB       Ankle EDB 5.57 6.00 2.8 2.5 100 8   7.08 100 31      FibHead EDB 14.48  3.1  109 33 37.1 38.0 9.11 89.1 31.1      Pop Fos EDB 17.45  3.0  106 10 33.7 38.0 9.01 90.1 31.1   L PERONEAL - EDB      Ankle AH 4.90 6.00 1.1 2.5 100 8   5.63 100 31.1      FibHead AH 15.16  0.7  69.2 39 38.0 38.0 9.11 87.6 31      Pop Fos AH 17.45  0.4  36.4 9 39.3  11.88 41.4 31   L TIBIAL - AH      Ankle AH 4.48 6.00 0.6 4.0 100 8   9.32 100 31.1      Pop Fos AH 18.59  0.6  95.6 47 33.3 38.0 11.09 102 31   R TIBIAL - AH      Ankle AH 5.31 6.00 0.9 4.0 100 8   7.34 100 31.1      (tchncl) Pop Fos AH NR  NR  NR   38.0 NR NR 31   R PERONEAL - Tib Ant      Fib Head Tib Ant 4.90  5.8  100    14.22 100 31.3      Knee Tib Ant 6.98  5.9  101 8.5 40.8  14.53 98.3 31.5   L PERONEAL - Tib Ant      Fib Head Tib Ant 4.64  4.5  100 15   6.41 100 31.5      Knee Tib Ant 5.89  4.3  96.6 8 64.0  7.08 98.1 31.3       F  Wave      Nerve Min F Lat Max F Lat Mean FLat Temp.    ms ms ms  C   R DEEP PERONEAL NR      L DEEP PERONEAL 50          EMG Summary Table     Spontaneous MUAP Recruitment    IA Fib PSW Fasc H.F. Amp Dur. PPP Pattern   R. TIB ANTERIOR N None None None None N N N N   R. PERON LONGUS N None None None None N N N N                                              Again, thank you for allowing me to participate in the care of your patient.        Sincerely,        Vinnie Wall MD     same name as above

## 2021-03-15 ENCOUNTER — EMERGENCY (EMERGENCY)
Facility: HOSPITAL | Age: 81
LOS: 0 days | Discharge: HOME | End: 2021-03-16
Attending: EMERGENCY MEDICINE
Payer: MEDICARE

## 2021-03-15 VITALS
TEMPERATURE: 97 F | RESPIRATION RATE: 97 BRPM | HEART RATE: 125 BPM | WEIGHT: 175.05 LBS | OXYGEN SATURATION: 97 % | SYSTOLIC BLOOD PRESSURE: 115 MMHG | HEIGHT: 68 IN | DIASTOLIC BLOOD PRESSURE: 80 MMHG

## 2021-03-15 DIAGNOSIS — I35.1 NONRHEUMATIC AORTIC (VALVE) INSUFFICIENCY: ICD-10-CM

## 2021-03-15 DIAGNOSIS — I49.5 SICK SINUS SYNDROME: ICD-10-CM

## 2021-03-15 DIAGNOSIS — I48.91 UNSPECIFIED ATRIAL FIBRILLATION: ICD-10-CM

## 2021-03-15 DIAGNOSIS — E03.9 HYPOTHYROIDISM, UNSPECIFIED: ICD-10-CM

## 2021-03-15 DIAGNOSIS — Z90.49 ACQUIRED ABSENCE OF OTHER SPECIFIED PARTS OF DIGESTIVE TRACT: Chronic | ICD-10-CM

## 2021-03-15 DIAGNOSIS — R42 DIZZINESS AND GIDDINESS: ICD-10-CM

## 2021-03-15 DIAGNOSIS — Z20.822 CONTACT WITH AND (SUSPECTED) EXPOSURE TO COVID-19: ICD-10-CM

## 2021-03-15 DIAGNOSIS — R53.1 WEAKNESS: ICD-10-CM

## 2021-03-15 DIAGNOSIS — Z87.898 PERSONAL HISTORY OF OTHER SPECIFIED CONDITIONS: Chronic | ICD-10-CM

## 2021-03-15 DIAGNOSIS — Z95.0 PRESENCE OF CARDIAC PACEMAKER: ICD-10-CM

## 2021-03-15 DIAGNOSIS — E78.00 PURE HYPERCHOLESTEROLEMIA, UNSPECIFIED: ICD-10-CM

## 2021-03-15 LAB
ALBUMIN SERPL ELPH-MCNC: 3.7 G/DL — SIGNIFICANT CHANGE UP (ref 3.3–5)
ALP SERPL-CCNC: 77 U/L — SIGNIFICANT CHANGE UP (ref 40–120)
ALT FLD-CCNC: 20 U/L — SIGNIFICANT CHANGE UP (ref 12–78)
ANION GAP SERPL CALC-SCNC: 10 MMOL/L — SIGNIFICANT CHANGE UP (ref 5–17)
APTT BLD: 35.4 SEC — SIGNIFICANT CHANGE UP (ref 27.5–35.5)
AST SERPL-CCNC: 15 U/L — SIGNIFICANT CHANGE UP (ref 15–37)
BASOPHILS # BLD AUTO: 0.01 K/UL — SIGNIFICANT CHANGE UP (ref 0–0.2)
BASOPHILS NFR BLD AUTO: 0.2 % — SIGNIFICANT CHANGE UP (ref 0–2)
BILIRUB SERPL-MCNC: 2 MG/DL — HIGH (ref 0.2–1.2)
BUN SERPL-MCNC: 11 MG/DL — SIGNIFICANT CHANGE UP (ref 7–23)
CALCIUM SERPL-MCNC: 9.4 MG/DL — SIGNIFICANT CHANGE UP (ref 8.5–10.1)
CHLORIDE SERPL-SCNC: 103 MMOL/L — SIGNIFICANT CHANGE UP (ref 96–108)
CO2 SERPL-SCNC: 25 MMOL/L — SIGNIFICANT CHANGE UP (ref 22–31)
CREAT SERPL-MCNC: 0.85 MG/DL — SIGNIFICANT CHANGE UP (ref 0.5–1.3)
EOSINOPHIL # BLD AUTO: 0.01 K/UL — SIGNIFICANT CHANGE UP (ref 0–0.5)
EOSINOPHIL NFR BLD AUTO: 0.2 % — SIGNIFICANT CHANGE UP (ref 0–6)
FLUAV AG NPH QL: SIGNIFICANT CHANGE UP
FLUBV AG NPH QL: SIGNIFICANT CHANGE UP
GLUCOSE SERPL-MCNC: 149 MG/DL — HIGH (ref 70–99)
HCT VFR BLD CALC: 39.1 % — SIGNIFICANT CHANGE UP (ref 34.5–45)
HGB BLD-MCNC: 13.3 G/DL — SIGNIFICANT CHANGE UP (ref 11.5–15.5)
IMM GRANULOCYTES NFR BLD AUTO: 0.6 % — SIGNIFICANT CHANGE UP (ref 0–1.5)
INR BLD: 1.64 RATIO — HIGH (ref 0.88–1.16)
LYMPHOCYTES # BLD AUTO: 0.92 K/UL — LOW (ref 1–3.3)
LYMPHOCYTES # BLD AUTO: 14.2 % — SIGNIFICANT CHANGE UP (ref 13–44)
MCHC RBC-ENTMCNC: 29.6 PG — SIGNIFICANT CHANGE UP (ref 27–34)
MCHC RBC-ENTMCNC: 34 GM/DL — SIGNIFICANT CHANGE UP (ref 32–36)
MCV RBC AUTO: 86.9 FL — SIGNIFICANT CHANGE UP (ref 80–100)
MONOCYTES # BLD AUTO: 0.52 K/UL — SIGNIFICANT CHANGE UP (ref 0–0.9)
MONOCYTES NFR BLD AUTO: 8 % — SIGNIFICANT CHANGE UP (ref 2–14)
NEUTROPHILS # BLD AUTO: 4.98 K/UL — SIGNIFICANT CHANGE UP (ref 1.8–7.4)
NEUTROPHILS NFR BLD AUTO: 76.8 % — SIGNIFICANT CHANGE UP (ref 43–77)
NRBC # BLD: 0 /100 WBCS — SIGNIFICANT CHANGE UP (ref 0–0)
PLATELET # BLD AUTO: 220 K/UL — SIGNIFICANT CHANGE UP (ref 150–400)
POTASSIUM SERPL-MCNC: 3.9 MMOL/L — SIGNIFICANT CHANGE UP (ref 3.5–5.3)
POTASSIUM SERPL-SCNC: 3.9 MMOL/L — SIGNIFICANT CHANGE UP (ref 3.5–5.3)
PROT SERPL-MCNC: 7.4 GM/DL — SIGNIFICANT CHANGE UP (ref 6–8.3)
PROTHROM AB SERPL-ACNC: 18.6 SEC — HIGH (ref 10.6–13.6)
RBC # BLD: 4.5 M/UL — SIGNIFICANT CHANGE UP (ref 3.8–5.2)
RBC # FLD: 12.5 % — SIGNIFICANT CHANGE UP (ref 10.3–14.5)
SARS-COV-2 RNA SPEC QL NAA+PROBE: SIGNIFICANT CHANGE UP
SODIUM SERPL-SCNC: 138 MMOL/L — SIGNIFICANT CHANGE UP (ref 135–145)
TROPONIN I SERPL-MCNC: 0.02 NG/ML — SIGNIFICANT CHANGE UP (ref 0.01–0.04)
TROPONIN I SERPL-MCNC: <.015 NG/ML — SIGNIFICANT CHANGE UP (ref 0.01–0.04)
WBC # BLD: 6.48 K/UL — SIGNIFICANT CHANGE UP (ref 3.8–10.5)
WBC # FLD AUTO: 6.48 K/UL — SIGNIFICANT CHANGE UP (ref 3.8–10.5)

## 2021-03-15 PROCEDURE — 0042T: CPT

## 2021-03-15 PROCEDURE — 70498 CT ANGIOGRAPHY NECK: CPT | Mod: 26,MA

## 2021-03-15 PROCEDURE — 93010 ELECTROCARDIOGRAM REPORT: CPT

## 2021-03-15 PROCEDURE — 99291 CRITICAL CARE FIRST HOUR: CPT | Mod: CS

## 2021-03-15 PROCEDURE — 70450 CT HEAD/BRAIN W/O DYE: CPT | Mod: 26,MA

## 2021-03-15 PROCEDURE — 70496 CT ANGIOGRAPHY HEAD: CPT | Mod: 26,MA

## 2021-03-15 RX ORDER — SODIUM CHLORIDE 9 MG/ML
500 INJECTION INTRAMUSCULAR; INTRAVENOUS; SUBCUTANEOUS ONCE
Refills: 0 | Status: COMPLETED | OUTPATIENT
Start: 2021-03-15 | End: 2021-03-15

## 2021-03-15 RX ORDER — MECLIZINE HCL 12.5 MG
50 TABLET ORAL ONCE
Refills: 0 | Status: COMPLETED | OUTPATIENT
Start: 2021-03-15 | End: 2021-03-15

## 2021-03-15 RX ORDER — ONDANSETRON 8 MG/1
8 TABLET, FILM COATED ORAL ONCE
Refills: 0 | Status: COMPLETED | OUTPATIENT
Start: 2021-03-15 | End: 2021-03-15

## 2021-03-15 RX ADMIN — ONDANSETRON 8 MILLIGRAM(S): 8 TABLET, FILM COATED ORAL at 17:30

## 2021-03-15 RX ADMIN — SODIUM CHLORIDE 500 MILLILITER(S): 9 INJECTION INTRAMUSCULAR; INTRAVENOUS; SUBCUTANEOUS at 19:10

## 2021-03-15 RX ADMIN — Medication 50 MILLIGRAM(S): at 17:30

## 2021-03-15 RX ADMIN — SODIUM CHLORIDE 500 MILLILITER(S): 9 INJECTION INTRAMUSCULAR; INTRAVENOUS; SUBCUTANEOUS at 20:18

## 2021-03-15 NOTE — ED ADULT NURSE NOTE - NSIMPLEMENTINTERV_GEN_ALL_ED
Implemented All Fall with Harm Risk Interventions:  [place] to call system. Call bell, personal items and telephone within reach. Instruct patient to call for assistance. Room bathroom lighting operational. Non-slip footwear when patient is off stretcher. Physically safe environment: no spills, clutter or unnecessary equipment. Stretcher in lowest position, wheels locked, appropriate side rails in place. Provide visual cue, wrist band, yellow gown, etc. Monitor gait and stability. Monitor for mental status changes and reorient to person, place, and time. Review medications for side effects contributing to fall risk. Reinforce activity limits and safety measures with patient and family. Provide visual clues: red socks.

## 2021-03-15 NOTE — ED ADULT TRIAGE NOTE - CHIEF COMPLAINT QUOTE
As per son took medication without eating around 9am and started to C/o dizziness and weakness shortly after. Now c/o abd pain with nausea.   On Eliquis  Was suppose to fly to [place] today

## 2021-03-15 NOTE — ED PROVIDER NOTE - PATIENT PORTAL LINK FT
You can access the FollowMyHealth Patient Portal offered by [facility] by registering at the following website: [email or web address]. By joining [employer]’s FollowMyHealth portal, you will also be able to view your health information using other applications (apps) compatible with our system.

## 2021-03-15 NOTE — ED PROVIDER NOTE - CLINICAL SUMMARY MEDICAL DECISION MAKING FREE TEXT BOX
Pt well appearing. Lab values do not require emergent intervention. pt symptoms improved. CE neg x 2. d/w son, states okay for dc, but does nto want to take pt home as feels she will have another panic attack (pt moving to [place], and flight was just prior to arrival when symptoms started). They want to pick pt up tomorrow prior their flight. Pt discharged, but pending family  tomorrow. Signout to Dr horta to monitor.

## 2021-03-15 NOTE — ED ADULT NURSE NOTE - PMH
Aortic valve insufficiency    Fall  at  home,  no  warning,  no  overt cause,  possible syncope,  had  cat  scan  bruise on left hip  seen at  [facility]  sept 19 2017,  no  injuries  Hypercholesterolemia    Hypothyroidism    Persistent atrial fibrillation    Tachy-sunitha syndrome

## 2021-03-15 NOTE — ED PROVIDER NOTE - PROGRESS NOTE DETAILS
pt feeling much better, all symtpoms resolved, able to ambulate with assistance (normally ambulates with walker due to arthritis). able to eat dinner.

## 2021-03-15 NOTE — ED PROVIDER NOTE - OBJECTIVE STATEMENT
81 yo F w/PMH of pacemaker (on Xarelto) presents to the ED for episode of headache, dizziness and nausea since 10am this morning. Pt reports she woke up without issue, took her medications and 10 minutes after started to feel sick. Last known well approximately 9am. Denies fever/chills, cough, SOB, CP, abdominal pain or V/D.    No fever/chills, No photophobia/eye pain/changes in vision, No ear pain/sore throat/dysphagia, No chest pain/palpitations, no SOB/cough/wheeze/stridor, No abdominal pain, +N, No V/D, no dysuria/frequency/discharge, No neck/back pain, no rash, no changes in neurological status/function. +Headache, dizziness.

## 2021-03-15 NOTE — ED PROVIDER NOTE - PHYSICAL EXAMINATION
Gen: Alert, eyes closed, appears nauseous   Head: NC, AT, PERRL, normal lids/conjunctiva   ENT: Bilateral TM WNL, patent oropharynx without erythema/exudate, uvula midline  Neck: supple, no tenderness/meningismus  Pulm: Bilateral clear BS, normal resp effort  CV: RRR, no M/R/G, +dist pulses   Abd: soft, NT/ND, +BS, no guarding/rebound tenderness  Mskel: no edema/erythema/cyanosis   Skin: no rash, no bruising  Neuro: AAOx3, no sensory/motor deficits, CN 2-12 intact , no aphasia, dysarthria, unable to test finger to nose

## 2021-03-15 NOTE — ED ADULT NURSE NOTE - NSFALLRSKOUTCOME_ED_ALL_ED
Quality 226: Preventive Care And Screening: Tobacco Use: Screening And Cessation Intervention: Patient screened for tobacco use and is an ex/non-smoker
Detail Level: Detailed
Quality 111:Pneumonia Vaccination Status For Older Adults: Pneumococcal Vaccination not Administered or Previously Received, Reason not Otherwise Specified
Quality 431: Preventive Care And Screening: Unhealthy Alcohol Use - Screening: Patient screened for unhealthy alcohol use using a single question and scores less than 2 times per year
Quality 110: Preventive Care And Screening: Influenza Immunization: Influenza Immunization previously received during influenza season
Fall with Harm Risk

## 2021-03-15 NOTE — ED PROVIDER NOTE - PMH
Aortic valve insufficiency    Fall  at  home,  no  warning,  no  overt cause,  possible syncope,  had  cat  scan  bruise on left hip  seen at  [facility]  sept 19 2017,  no  injuries  Hypercholesterolemia    Hypothyroidism    Persistent atrial fibrillation    Tachy-sunitha syndrome

## 2021-03-16 VITALS
HEART RATE: 60 BPM | RESPIRATION RATE: 20 BRPM | TEMPERATURE: 98 F | OXYGEN SATURATION: 95 % | DIASTOLIC BLOOD PRESSURE: 71 MMHG | SYSTOLIC BLOOD PRESSURE: 107 MMHG
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
## 2021-03-16 PROBLEM — E03.9 HYPOTHYROIDISM, UNSPECIFIED: Chronic | Status: ACTIVE | Noted: 2017-02-14

## 2021-03-16 PROBLEM — I48.1 PERSISTENT ATRIAL FIBRILLATION: Chronic | Status: ACTIVE | Noted: 2017-02-14

## 2021-03-16 PROBLEM — I35.1 NONRHEUMATIC AORTIC (VALVE) INSUFFICIENCY: Chronic | Status: ACTIVE | Noted: 2017-02-14

## 2021-03-16 PROBLEM — I49.5 SICK SINUS SYNDROME: Chronic | Status: ACTIVE | Noted: 2017-10-24

## 2021-03-16 PROBLEM — E78.00 PURE HYPERCHOLESTEROLEMIA, UNSPECIFIED: Chronic | Status: ACTIVE | Noted: 2017-02-14

## 2021-03-16 RX ORDER — LEVOTHYROXINE SODIUM 125 MCG
1 TABLET ORAL
Qty: 0 | Refills: 0 | DISCHARGE

## 2021-03-16 RX ORDER — ESCITALOPRAM OXALATE 10 MG/1
0 TABLET, FILM COATED ORAL
Qty: 0 | Refills: 0 | DISCHARGE

## 2021-03-16 RX ORDER — CHOLECALCIFEROL (VITAMIN D3) 125 MCG
1 CAPSULE ORAL
Qty: 0 | Refills: 0 | DISCHARGE

## 2021-03-16 RX ORDER — ROSUVASTATIN CALCIUM 5 MG/1
0 TABLET ORAL
Qty: 0 | Refills: 0 | DISCHARGE

## 2021-03-16 RX ORDER — RIVAROXABAN 15 MG-20MG
1 KIT ORAL
Qty: 0 | Refills: 0 | DISCHARGE

## 2021-03-16 RX ORDER — SIMVASTATIN 20 MG/1
1 TABLET, FILM COATED ORAL
Qty: 0 | Refills: 0 | DISCHARGE

## 2021-03-16 RX ORDER — APIXABAN 2.5 MG/1
1 TABLET, FILM COATED ORAL
Qty: 0 | Refills: 0 | DISCHARGE

## 2021-03-16 RX ORDER — OXYBUTYNIN CHLORIDE 5 MG
1 TABLET ORAL
Qty: 0 | Refills: 0 | DISCHARGE

## 2021-03-16 RX ORDER — EZETIMIBE 10 MG/1
0 TABLET ORAL
Qty: 0 | Refills: 0 | DISCHARGE

## 2021-03-16 NOTE — ED ADULT NURSE REASSESSMENT NOTE - NS ED NURSE REASSESS COMMENT FT1
Pt received in bed AOx4 with no complaints of pain, discomfort, nausea or dizziness. Pt states she feels "totally fine." Pt has 500mL bag of NS running in 22ga IV access in left AC. IV is patent with dressing clean, dry and intact. Bedside report received from Candace HAINES RN.
AxOx4 , no complaints, waiting for family

## 2023-02-17 NOTE — DISCHARGE NOTE ADULT - NS TRANSFER PATIENT BELONGINGS
"Zeenat Fox CNP  You Just now (9:31 AM)     ENEIDA Castillo!     It is OK for Spencer to stop aspirin now that he is on apixaban. Thank you!     -Zeenat         As Dr. Pabon is on vacation, rerouted message regarding Aspirin to Zeenat Fox CNP.  Above response received.  Called and updated patient on response and recommendation to discontinue the Aspirin going forward.  Aspirin removed from the medication list.     Patient also had questions regarding how the home monitor worked and if it was connected.  Verified it was connected on the [employer] website and reviewed how the home monitor works.     Patient also stated that he felt a little \"out of it\" and \"not my normal self\" over the last two days (2/15 and 2/16).  Today he feels back to normal.  Explained that as his device was just implanted on 2/13/23, this could be related to the healing process.  Requested patient monitor his symptoms and notify clinic if he has any issues.     Patient verbalized understanding and agreement with plan.     MONICA Alberts     "
Felix Pabon MD  You 2 hours ago (9:21 AM)     [place]  He should start Eliquis 5 mg twice daily.  Felix          Received above response from Dr. Pabon.  Called and updated patient on recommendation.  Patient verbalized understanding and agreement with plan.  Prescription sent to [facility] per patient request.    Patient is also on Aspirin 81mg daily.  Per patient, this was changed from Aspirin 325mg daily to Aspirin 81mg daily after his TAVR.  Instructed patient to hold Aspirin at this time and explained we will clarify if he should continue that now that he is on the Eliquis.    Pt verbalized understanding and agreement with plan.  Message routed to Dr. Pabon to clarify if patient should hold or continue the Aspirin 81mg daily.    MONICA Alberts   
Remote alert received for episode logged on 2/14/23 at 2227.  EGM shows AF/AFL lasting [id number] with  rate of 70.  Pt had a TAVR on 1/17/23 and a PPM placed on 2/13/23.  Patient is not on anticoagulant.      Called and spoke to patient who stated he was sleeping at time of episode and was asymptomatic.      Will route message to Dr. Pabon who consulted on patient at most recent hospitalization.      MONICA Alberts           EGM:         
Clothing

## 2023-04-25 NOTE — DISCHARGE NOTE ADULT - NS AS DC AMI YN
Norma Morel  to Me        4/25/23  8:18 AM  m for callback to schedule 3 month lab check for Vitamin D.   .      no

## 2024-06-13 NOTE — ED ADULT NURSE NOTE - PAIN: PRESENCE, MLM
Have you received your Prep? Any questions with prep instructions?  Only Clear Liquid Diet day before.  Nothing to eat after midnight day before procedure.  Are you taking any blood thinners? If so, you need to Stop.  Remove any jewelry and body piercings.  Do you wear glasses? If so, please bring a case to store them in.  Are you having any Covid symptoms?  Do you have any new rashes, infections, etc. that we should be aware of?  Do you have a ride home the day of surgery? It cannot be a cab or medical transportation.  Verify surgery time/date and what time to arrive at hospital.   Left message regarding arrival time, procedure time, prep instructions, clear liquid diet on Sunday, npo status at midnight on Sunday, pre op phone number for questions.   denies pain/discomfort